# Patient Record
Sex: FEMALE | Race: WHITE | NOT HISPANIC OR LATINO | Employment: OTHER | URBAN - METROPOLITAN AREA
[De-identification: names, ages, dates, MRNs, and addresses within clinical notes are randomized per-mention and may not be internally consistent; named-entity substitution may affect disease eponyms.]

---

## 2024-05-03 ENCOUNTER — HOSPITAL ENCOUNTER (OUTPATIENT)
Facility: HOSPITAL | Age: 59
Discharge: HOME | End: 2024-05-03
Attending: EMERGENCY MEDICINE | Admitting: EMERGENCY MEDICINE
Payer: MEDICARE

## 2024-05-03 VITALS
BODY MASS INDEX: 30.6 KG/M2 | SYSTOLIC BLOOD PRESSURE: 175 MMHG | RESPIRATION RATE: 18 BRPM | TEMPERATURE: 97.8 F | HEART RATE: 100 BPM | HEIGHT: 63 IN | WEIGHT: 172.7 LBS | DIASTOLIC BLOOD PRESSURE: 89 MMHG | OXYGEN SATURATION: 98 %

## 2024-05-03 DIAGNOSIS — L03.116 CELLULITIS OF LEFT LEG: Primary | ICD-10-CM

## 2024-05-03 LAB
BILIRUB UR QL STRIP: NEGATIVE
CLARITY UR: CLEAR
COLOR UR: YELLOW
GLUCOSE UR STRIP-MCNC: NEGATIVE MG/DL
HGB UR QL STRIP.AUTO: NEGATIVE
KETONES UR QL STRIP: NEGATIVE
LEUKOCYTE ESTERASE UR QL STRIP.AUTO: NEGATIVE
NITRITE UR QL STRIP: NEGATIVE
PH UR STRIP.AUTO: <=5 [PH] (ref 5–8)
PROT UR QL STRIP: NEGATIVE
SP GR UR STRIP: >=1.03 (ref 1–1.03)
UROBILINOGEN UR QL STRIP: NORMAL

## 2024-05-03 PROCEDURE — 81003 URINALYSIS AUTO W/O SCOPE: CPT | Performed by: EMERGENCY MEDICINE

## 2024-05-03 PROCEDURE — 99203 OFFICE O/P NEW LOW 30 MIN: CPT | Performed by: EMERGENCY MEDICINE

## 2024-05-03 PROCEDURE — G0463 HOSPITAL OUTPT CLINIC VISIT: HCPCS | Performed by: EMERGENCY MEDICINE

## 2024-05-03 RX ORDER — DOXYCYCLINE 100 MG/1
100 CAPSULE ORAL 2 TIMES DAILY
Qty: 20 CAPSULE | Refills: 0 | Status: SHIPPED | OUTPATIENT
Start: 2024-05-03 | End: 2024-05-13

## 2024-05-03 NOTE — FSED PROVIDER NOTE
Subjective   History of Present Illness  58-year-old female complains of infected bug bites to the left lower leg.  She says a few days ago she fell has some bug bites or puncture wounds to her leg may have gotten infected.  Also complains of having some urinary incontinence which she has had in the past and takes oxybutynin for but her oxybutynin is not controlling and she says in the past when this happened she had UTI.  No fevers no chills feels fine otherwise.  Review of Systems   Genitourinary:         As noted in HPI   Skin:         As noted in HPI       History reviewed. No pertinent past medical history.    No Known Allergies    History reviewed. No pertinent surgical history.    History reviewed. No pertinent family history.    Social History     Socioeconomic History    Marital status: Single           Objective   Physical Exam  Nursing notes reviewed.  INITIAL VITAL SIGNS: Reviewed by me.  Pulse ox normal  GENERAL: Alert. Well developed and well nourished. No respiratory distress.  SKIN: Anterior lower shin with 4 cm area of erythema induration and tenderness with open wound, no fluctuance.  Just above the lateral ankle is another small area of erythema induration and tenderness without fluctuance  NEUROLOGIC: Alert. Normal gait    Procedures           ED Course                                           Medical Decision Making  Patient with some wounds that appear infected no evidence for abscess on exam, will treat with antibiotics.  Regarding her urinary incontinence and oxybutynin not working, her urinalysis looks totally clean.  Will have her follow-up with her doctor.    Final diagnoses:   Cellulitis of left leg       ED Disposition  ED Disposition       ED Disposition   Discharge    Condition   Good    Comment   --               Sheri Yi, APRN  [address], [address]  [place] [address]  [phone]    Call   To schedule follow-up appointment         Medication List        New  Prescriptions      doxycycline 100 MG capsule  Commonly known as: MONODOX  Take 1 capsule by mouth 2 (Two) Times a Day for 10 days.               Where to Get Your Medications        These medications were sent to [facility] [id number] - [place], [place] - [facility] - [phone]  - [phone] [address] [place] [address]      Phone: [phone]   doxycycline 100 MG capsule

## 2024-05-03 NOTE — DISCHARGE INSTRUCTIONS
Take medications as prescribed.  If you develop worsening symptoms despite antibiotics please return for reevaluation.  Follow-up with your doctor regarding your urinary incontinence.